# Patient Record
Sex: FEMALE | Race: OTHER | NOT HISPANIC OR LATINO | URBAN - METROPOLITAN AREA
[De-identification: names, ages, dates, MRNs, and addresses within clinical notes are randomized per-mention and may not be internally consistent; named-entity substitution may affect disease eponyms.]

---

## 2018-03-08 ENCOUNTER — EMERGENCY (EMERGENCY)
Facility: HOSPITAL | Age: 53
LOS: 1 days | Discharge: HOME | End: 2018-03-08
Attending: EMERGENCY MEDICINE | Admitting: EMERGENCY MEDICINE
Payer: COMMERCIAL

## 2018-03-08 VITALS
OXYGEN SATURATION: 98 % | DIASTOLIC BLOOD PRESSURE: 79 MMHG | HEART RATE: 89 BPM | TEMPERATURE: 98 F | WEIGHT: 113.98 LBS | RESPIRATION RATE: 18 BRPM | HEIGHT: 61 IN | SYSTOLIC BLOOD PRESSURE: 152 MMHG

## 2018-03-08 DIAGNOSIS — R51 HEADACHE: ICD-10-CM

## 2018-03-08 DIAGNOSIS — L56.8 OTHER SPECIFIED ACUTE SKIN CHANGES DUE TO ULTRAVIOLET RADIATION: ICD-10-CM

## 2018-03-08 PROCEDURE — 99283 EMERGENCY DEPT VISIT LOW MDM: CPT

## 2018-03-08 PROCEDURE — 99282 EMERGENCY DEPT VISIT SF MDM: CPT

## 2018-03-08 NOTE — ED PROVIDER NOTE - MEDICAL DECISION MAKING DETAILS
51 y/o f hx migraines presents stating had migraine headache today after coming to work late, stressed due to long commute.  Pt stating she took her own medication, asked to rest for 30 mins.  Nonfocal neuro exam, pt refusing meds, reassessed and now asymptomatic and asking for d/c.

## 2018-03-08 NOTE — ED ADULT NURSE NOTE - OBJECTIVE STATEMENT
Patient is a 51yo female reporting headache this morning on her way to work. Patient reports associated left eye vision changes, dizziness, and nausea. Patient states, "this feels like my migraines and I just need to lie down for half an hour, I don't want any treatment." Patient reports taking Cinnarizine this morning with some relief. Neuro intact. Denies gait changes, numbness/tingling.

## 2018-03-08 NOTE — ED PROVIDER NOTE - OBJECTIVE STATEMENT
53 y/o f hx migraine HA presents stating she was on her way to work, running late and began to get stressed.  Pt stating this brought on her typical migraine which is left sided, associated with photosensitivity.  Pt stating she already took her own medication which has started to help, although stating she needs some dark place to lie down for a few mins until headache subsides.  Denies n/v, visual changes, dizziness, all other ROS negative.

## 2018-03-08 NOTE — ED ADULT TRIAGE NOTE - OTHER COMPLAINTS
Pt also C/O nausea. Pt denies vomiting, dizziness, chest pain, SOB. Pt is A&O x3, able to speak coherently, no facial droop, no arm drift.

## 2018-03-08 NOTE — ED PROVIDER NOTE - ATTENDING CONTRIBUTION TO CARE
I have seen and evaluated the pt, and I agree with the documentation/care/plan executed by EVELINE Calderon.